# Patient Record
Sex: FEMALE | Race: ASIAN | NOT HISPANIC OR LATINO
[De-identification: names, ages, dates, MRNs, and addresses within clinical notes are randomized per-mention and may not be internally consistent; named-entity substitution may affect disease eponyms.]

---

## 2019-05-14 ENCOUNTER — APPOINTMENT (OUTPATIENT)
Dept: UROLOGY | Facility: CLINIC | Age: 64
End: 2019-05-14

## 2022-05-31 ENCOUNTER — APPOINTMENT (OUTPATIENT)
Dept: UROLOGY | Facility: CLINIC | Age: 67
End: 2022-05-31
Payer: COMMERCIAL

## 2022-05-31 VITALS
HEIGHT: 62 IN | DIASTOLIC BLOOD PRESSURE: 67 MMHG | WEIGHT: 124 LBS | SYSTOLIC BLOOD PRESSURE: 115 MMHG | HEART RATE: 79 BPM | TEMPERATURE: 97.5 F | BODY MASS INDEX: 22.82 KG/M2 | RESPIRATION RATE: 16 BRPM | OXYGEN SATURATION: 98 %

## 2022-05-31 DIAGNOSIS — Z00.00 ENCOUNTER FOR GENERAL ADULT MEDICAL EXAMINATION W/OUT ABNORMAL FINDINGS: ICD-10-CM

## 2022-05-31 DIAGNOSIS — R31.29 OTHER MICROSCOPIC HEMATURIA: ICD-10-CM

## 2022-05-31 DIAGNOSIS — Z78.9 OTHER SPECIFIED HEALTH STATUS: ICD-10-CM

## 2022-05-31 DIAGNOSIS — R82.81 PYURIA: ICD-10-CM

## 2022-05-31 PROCEDURE — 76775 US EXAM ABDO BACK WALL LIM: CPT

## 2022-05-31 PROCEDURE — 99204 OFFICE O/P NEW MOD 45 MIN: CPT

## 2022-06-04 LAB
BACTERIA UR CULT: NORMAL
C TRACH RRNA SPEC QL NAA+PROBE: NOT DETECTED
N GONORRHOEA RRNA SPEC QL NAA+PROBE: NOT DETECTED
SOURCE AMPLIFICATION: NORMAL

## 2022-06-05 NOTE — LETTER BODY
Last seen: 1/8/20  RTC: 1 month  Cancel: none  No-show: none  Next appt: 2/12/20     Incoming refill from pharmacy and pt call      Medication requested: ziprasidone (GEODON) 60 MG capsule  Directions: Take 1 capsule (60 mg) by mouth 2 times daily (with meals)  Qty: 60  Last refilled: approximately 1/8/20 per med tab      -Medication refill approved per refill protocol    -30 d/s sent to pt's preferred pharmacy     
[FreeTextEntry1] : Nba Ramirez MD \par [address], [address], \[place] [address]\par [phone]\par \par Dear Dr. Ramirez, \par \par Reason for Visit: Abnormal urinalysis. Pyuria. \par \par This is a 66 year-old Cantonese-speaking woman with abnormal urinalysis. The patient is referred for evaluation of her condition. Her urinalysis demonstrated evidence of pyuria, 6-10 WBC/HPF. The patient denies any gross hematuria or dysuria or urinary incontinence. She denies any aggravating or relieving factors. The patient denies any interference of function. She is entirely asymptomatic. All other review of systems are negative. She has no cancer in her family medical history. She has no previous surgical history. Past medical history, family history and social history were inquired and were noncontributory to current condition. The patient does not use tobacco or drink alcohol. Medications and allergies were reviewed. She has no known allergies to medication. \par \par On examination, the patient is a healthy-appearing woman in no acute distress. She is alert and oriented and follows commands. She  has normal mood and affect. She is normocephalic. Oral no thrush. Neck is supple. Respirations are unlabored. Abdomen is soft and nontender. Liver is nonpalpable. Bladder is nonpalpable. No CVA tenderness. Neurologically she is grossly intact. No peripheral edema. Skin without gross abnormality.\par \par Her BMP demonstrated normal renal functions, creatinine 0.65. Her urinalysis demonstrated evidence of pyuria, 6-10 WBC/HPF.  \par \par Assessment: Abnormal urinalysis. Pyuria. \par \par I counseled the patient. I discussed the various etiologies of her symptoms. I recommended the patient obtain a renal ultrasound for further evaluation. I also recommended the patient obtain urine culture, quantiferon plus TB, chlamydia/GC amplification, and ureaplasma/mycoplasma genital culture. Risks and alternatives were discussed. I answered the patient's questions. The patient will follow-up as directed and will contact me with any questions or concerns. Thank you for the opportunity to participate in the care of Ms. SOUSA. I will keep you updated on her progress.\par \par Plan: Renal ultrasound. Urine culture. Quantiferon plus TB. Chlamydia/GC amplification. Ureaplasma/mycoplasma genital culture.  Follow up as directed. \par \par I personally reviewed ultrasound images with the patient today and images demonstrated a 1.3 cm anechoic focus with posterior enhancement, likely a cyst in the left kidney pelvic area. Both kidneys appear normal in size and echogenicity. No stones, solid masses or hydronephrosis visualized

## 2022-06-05 NOTE — ADDENDUM
[FreeTextEntry1] : Entered by Darrell Ortega, acting as scribe for Dr. Latrell Freedman.\par \par The documentation recorded by the scribe accurately reflects the service I personally performed and the decisions made by me.

## 2022-06-08 LAB
MYCOPLASMA HOMINIS CULTURE: NEGATIVE
UREAPLASMA CULTURE: NEGATIVE

## 2022-06-15 ENCOUNTER — NON-APPOINTMENT (OUTPATIENT)
Age: 67
End: 2022-06-15